# Patient Record
Sex: FEMALE | Race: WHITE | Employment: PART TIME | URBAN - NONMETROPOLITAN AREA
[De-identification: names, ages, dates, MRNs, and addresses within clinical notes are randomized per-mention and may not be internally consistent; named-entity substitution may affect disease eponyms.]

---

## 2016-06-22 LAB
CHOLESTEROL, TOTAL: 164 MG/DL
CHOLESTEROL/HDL RATIO: 3.9
HDLC SERPL-MCNC: 42 MG/DL (ref 35–70)
LDL CHOLESTEROL CALCULATED: 105 MG/DL (ref 0–160)
TRIGL SERPL-MCNC: 85 MG/DL
VLDLC SERPL CALC-MCNC: 17 MG/DL

## 2017-06-13 RX ORDER — FLUOXETINE 10 MG/1
1 CAPSULE ORAL DAILY
Refills: 0 | COMMUNITY
Start: 2017-05-02 | End: 2017-06-13 | Stop reason: SDUPTHER

## 2017-06-14 RX ORDER — FLUOXETINE 10 MG/1
10 CAPSULE ORAL DAILY
Qty: 30 CAPSULE | Refills: 5 | Status: SHIPPED | OUTPATIENT
Start: 2017-06-14 | End: 2017-10-03 | Stop reason: ALTCHOICE

## 2017-10-02 VITALS
HEIGHT: 71 IN | HEART RATE: 80 BPM | SYSTOLIC BLOOD PRESSURE: 108 MMHG | WEIGHT: 162 LBS | DIASTOLIC BLOOD PRESSURE: 68 MMHG | BODY MASS INDEX: 22.68 KG/M2

## 2017-10-02 DIAGNOSIS — Z14.1 CYSTIC FIBROSIS GENE CARRIER: ICD-10-CM

## 2017-10-02 RX ORDER — NAPROXEN 500 MG/1
500 TABLET ORAL 2 TIMES DAILY WITH MEALS
COMMUNITY
End: 2017-10-03 | Stop reason: ALTCHOICE

## 2017-10-03 ENCOUNTER — OFFICE VISIT (OUTPATIENT)
Dept: FAMILY MEDICINE CLINIC | Age: 52
End: 2017-10-03

## 2017-10-03 VITALS
SYSTOLIC BLOOD PRESSURE: 122 MMHG | BODY MASS INDEX: 22.76 KG/M2 | DIASTOLIC BLOOD PRESSURE: 70 MMHG | HEIGHT: 70 IN | WEIGHT: 159 LBS | HEART RATE: 64 BPM

## 2017-10-03 DIAGNOSIS — J01.40 ACUTE NON-RECURRENT PANSINUSITIS: Primary | ICD-10-CM

## 2017-10-03 PROCEDURE — 99213 OFFICE O/P EST LOW 20 MIN: CPT | Performed by: FAMILY MEDICINE

## 2017-10-03 RX ORDER — FLUTICASONE PROPIONATE 50 MCG
1 SPRAY, SUSPENSION (ML) NASAL DAILY
Qty: 1 BOTTLE | Refills: 3 | Status: SHIPPED | OUTPATIENT
Start: 2017-10-03 | End: 2017-10-05 | Stop reason: SDUPTHER

## 2017-10-03 RX ORDER — CEFUROXIME AXETIL 250 MG/1
250 TABLET ORAL 2 TIMES DAILY
Qty: 20 TABLET | Refills: 0 | Status: SHIPPED | OUTPATIENT
Start: 2017-10-03 | End: 2017-10-05 | Stop reason: SDUPTHER

## 2017-10-03 ASSESSMENT — ENCOUNTER SYMPTOMS
SWOLLEN GLANDS: 0
COUGH: 0
SINUS PRESSURE: 1
SHORTNESS OF BREATH: 0
SORE THROAT: 0

## 2017-10-03 NOTE — PROGRESS NOTES
[address] [address] [address]  Dept: [phone]  Dept Fax: [phone]    Rowan Cannon is a 54 y.o. female who presents today for her medical conditions/complaints as noted below. Rwoan Cannon is c/o of Sinusitis (pt c/o sinus pressure and pressure in eyes especially right eye x four days, nasal drainage taking Sudafed, denies cough, slight ha, denies sore throat)      HPI:     Sinusitis   This is a new problem. The current episode started in the past 7 days. The problem has been gradually worsening since onset. There has been no fever. The pain is mild. Associated symptoms include congestion, headaches, sinus pressure and sneezing. Pertinent negatives include no chills, coughing, diaphoresis, ear pain, neck pain, shortness of breath, sore throat or swollen glands. Past treatments include acetaminophen and oral decongestants. The treatment provided mild relief. BP Readings from Last 3 Encounters:   10/03/17 122/70   05/24/16 108/68            (goal 120/80)    Past Medical History:   Diagnosis Date    Cervical dysplasia     Nasal septal deviation       Past Surgical History:   Procedure Laterality Date    SEPTOPLASTY  04/2007    TUBAL LIGATION  12/2010     Family History   Problem Relation Age of Onset    High Cholesterol Mother     Bipolar Disorder Father      Social History   Substance Use Topics    Smoking status: Never Smoker    Smokeless tobacco: Not on file    Alcohol use Yes      Comment: 1-2 drinks/day        Current Outpatient Prescriptions   Medication Sig Dispense Refill    fluticasone (FLONASE) 50 MCG/ACT nasal spray 1 spray by Nasal route daily 1 Bottle 3    cefUROXime (CEFTIN) 250 MG tablet Take 1 tablet by mouth 2 times daily for 10 days 20 tablet 0     No current facility-administered medications for this visit.       Allergies   Allergen Reactions    Amitriptyline      Excess drowsiness    Bactrim [Sulfamethoxazole-Trimethoprim]     Sulfa Antibiotics        Health Maintenance   Topic Date Due    HIV screen  10/15/1982    DTaP/Tdap/Td vaccine (1 - Tdap) 10/15/1986    Flu vaccine (1) 2017    Cervical cancer screen  06/10/2019    Lipid screen  2021       Subjective:      Review of Systems   Constitutional: Negative for chills and diaphoresis. HENT: Positive for congestion, sinus pressure and sneezing. Negative for ear pain and sore throat. Respiratory: Negative for cough and shortness of breath. Musculoskeletal: Negative for neck pain. Neurological: Positive for headaches. Objective:     /70  Pulse 64  Ht 5' 10.2\" (1.783 m)  Wt 159 lb (72.1 kg)  BMI 22.68 kg/m2    Physical Exam   Constitutional: No distress. HENT:   Head: Normocephalic and atraumatic. Right Ear: Tympanic membrane normal.   Left Ear: Tympanic membrane normal.   Nose: Mucosal edema and rhinorrhea present. No epistaxis. Right sinus exhibits no maxillary sinus tenderness and no frontal sinus tenderness. Left sinus exhibits no maxillary sinus tenderness and no frontal sinus tenderness. Eyes: EOM are normal. Pupils are equal, round, and reactive to light. Right conjunctiva is injected. Left conjunctiva is injected. Neck: No tracheal tenderness present. Cardiovascular: S1 normal and S2 normal.    No murmur heard. Pulmonary/Chest: Breath sounds normal.       Assessment:     1. Acute non-recurrent pansinusitis  fluticasone (FLONASE) 50 MCG/ACT nasal spray    cefUROXime (CEFTIN) 250 MG tablet            POC Testing Results (If Applicable):  No results found for this visit on 10/03/17.     Plan:     Prescriptions:    Orders Placed This Encounter   Medications    fluticasone (FLONASE) 50 MCG/ACT nasal spray     Si spray by Nasal route daily     Dispense:  1 Bottle     Refill:  3    cefUROXime (CEFTIN) 250 MG tablet     Sig: Take 1 tablet by mouth 2 times daily for 10 days     Dispense:  20 tablet

## 2017-10-05 RX ORDER — FLUTICASONE PROPIONATE 50 MCG
1 SPRAY, SUSPENSION (ML) NASAL DAILY
Qty: 1 BOTTLE | Refills: 3 | Status: SHIPPED | OUTPATIENT
Start: 2017-10-05

## 2017-10-05 RX ORDER — CEFUROXIME AXETIL 250 MG/1
250 TABLET ORAL 2 TIMES DAILY
Qty: 20 TABLET | Refills: 0 | Status: SHIPPED | OUTPATIENT
Start: 2017-10-05 | End: 2017-10-15

## 2017-11-17 DIAGNOSIS — Z12.31 ENCOUNTER FOR SCREENING MAMMOGRAM FOR MALIGNANT NEOPLASM OF BREAST: Primary | ICD-10-CM

## 2018-06-11 ENCOUNTER — TELEPHONE (OUTPATIENT)
Dept: FAMILY MEDICINE CLINIC | Age: 53
End: 2018-06-11

## 2019-09-25 ENCOUNTER — OFFICE VISIT (OUTPATIENT)
Dept: FAMILY MEDICINE CLINIC | Age: 54
End: 2019-09-25
Payer: COMMERCIAL

## 2019-09-25 VITALS
BODY MASS INDEX: 21.08 KG/M2 | SYSTOLIC BLOOD PRESSURE: 114 MMHG | DIASTOLIC BLOOD PRESSURE: 76 MMHG | RESPIRATION RATE: 16 BRPM | WEIGHT: 150.6 LBS | HEIGHT: 71 IN | HEART RATE: 80 BPM

## 2019-09-25 DIAGNOSIS — M54.31 SCIATICA OF RIGHT SIDE: ICD-10-CM

## 2019-09-25 DIAGNOSIS — M79.672 BILATERAL FOOT PAIN: ICD-10-CM

## 2019-09-25 DIAGNOSIS — D72.819 LEUKOPENIA, UNSPECIFIED TYPE: ICD-10-CM

## 2019-09-25 DIAGNOSIS — M79.671 BILATERAL FOOT PAIN: ICD-10-CM

## 2019-09-25 DIAGNOSIS — Z11.4 SCREENING FOR HIV (HUMAN IMMUNODEFICIENCY VIRUS): ICD-10-CM

## 2019-09-25 DIAGNOSIS — Z12.39 SCREENING FOR BREAST CANCER: ICD-10-CM

## 2019-09-25 DIAGNOSIS — M79.671 PAIN IN BOTH FEET: Primary | ICD-10-CM

## 2019-09-25 DIAGNOSIS — M79.672 PAIN IN BOTH FEET: Primary | ICD-10-CM

## 2019-09-25 PROBLEM — H91.90 HEARING LOSS: Status: ACTIVE | Noted: 2019-09-25

## 2019-09-25 LAB
ADDITIONAL TESTING: NORMAL
ANA SCREEN: NORMAL
BASOPHILS # BLD: 0.05 THOU/MM3 (ref 0–0.3)
C-REACTIVE PROTEIN: <0.5 MG/DL (ref 0–1)
DIFFERENTIAL: AUTOMATED DIFF
EOSINOPHIL # BLD: 0.1 THOU/MM3 (ref 0–1.1)
HCT VFR BLD CALC: 41.5 % (ref 37–47)
HEMOGLOBIN: 13.4 G/DL (ref 12–16)
HIV AG/AB: NORMAL
HLA B27: NORMAL
LYMPHOCYTES # BLD: 0.94 THOU/MM3 (ref 1–5.5)
MCH RBC QN AUTO: 31 PG (ref 28.5–32)
MCHC RBC AUTO-ENTMCNC: 32.2 G/DL (ref 32–37)
MCV RBC AUTO: 96.5 FL (ref 80–94)
MONOCYTES # BLD: 0.41 THOU/MM3 (ref 0.1–1)
NEUTROPHILS: 2.34 THOU/MM3 (ref 2–8.1)
PDW BLD-RTO: 11.1 % (ref 8.5–15.5)
PLATELET # BLD: 218 THOU/MM3 (ref 130–400)
PMV BLD AUTO: 6.8 FL (ref 7.4–11)
RA TITER: NORMAL
RBC # BLD: 4.3 M/UL (ref 4.2–5.4)
SEDIMENTATION RATE, ERYTHROCYTE: 10 MM/HR (ref 0–30)
URIC ACID: 3.6 MG/DL (ref 3.5–8.5)
WBC # BLD: 3.84 THOU/ML3 (ref 4.8–10)

## 2019-09-25 PROCEDURE — G8427 DOCREV CUR MEDS BY ELIG CLIN: HCPCS | Performed by: FAMILY MEDICINE

## 2019-09-25 PROCEDURE — 3017F COLORECTAL CA SCREEN DOC REV: CPT | Performed by: FAMILY MEDICINE

## 2019-09-25 PROCEDURE — G8420 CALC BMI NORM PARAMETERS: HCPCS | Performed by: FAMILY MEDICINE

## 2019-09-25 PROCEDURE — 99203 OFFICE O/P NEW LOW 30 MIN: CPT | Performed by: FAMILY MEDICINE

## 2019-09-25 PROCEDURE — 1036F TOBACCO NON-USER: CPT | Performed by: FAMILY MEDICINE

## 2019-09-25 RX ORDER — AMLODIPINE BESYLATE 2.5 MG/1
2.5 TABLET ORAL DAILY
Qty: 30 TABLET | Refills: 5 | Status: SHIPPED | OUTPATIENT
Start: 2019-09-25 | End: 2019-11-26

## 2019-09-25 RX ORDER — TRAMADOL HYDROCHLORIDE 50 MG/1
50 TABLET ORAL EVERY 6 HOURS PRN
Qty: 20 TABLET | Refills: 0 | Status: SHIPPED | OUTPATIENT
Start: 2019-09-25 | End: 2019-09-30

## 2019-09-25 ASSESSMENT — PATIENT HEALTH QUESTIONNAIRE - PHQ9
2. FEELING DOWN, DEPRESSED OR HOPELESS: 0
SUM OF ALL RESPONSES TO PHQ QUESTIONS 1-9: 0
SUM OF ALL RESPONSES TO PHQ9 QUESTIONS 1 & 2: 0
SUM OF ALL RESPONSES TO PHQ QUESTIONS 1-9: 0
1. LITTLE INTEREST OR PLEASURE IN DOING THINGS: 0

## 2019-09-25 NOTE — PATIENT INSTRUCTIONS
Patient Education        Raynaud's Phenomenon: Care Instructions  Overview  Raynaud's is a condition that causes your hands and feet to overreact to cold. They may become painful and numb, and they can change colors, becoming very pale and then blue. This condition also is called Raynaud's phenomenon. There are two kinds of Raynaud's. Primary Raynaud's, also known as Raynaud's disease, happens by itself and is the most common form. Secondary Raynaud's, also called Raynaud's syndrome, happens as part of another disease. In Raynaud's, the small vessels that bring blood to the skin either become narrow, or constrict for a short period of time. This limits blood flow to the hands and feet and sometimes to the nose or ears. Your hands and feet feel cold and numb and then turn very pale. As blood flow returns, your fingers and toes may turn red, and begin to throb and hurt. Raynaud's can be painful and annoying, but it usually does not cause serious problems. You can take simple steps to protect your hands and feet from the cold. If you have a bad case of Raynaud's and you cannot keep your hands and feet warm enough, your doctor may prescribe medicine. Follow-up care is a key part of your treatment and safety. Be sure to make and go to all appointments, and call your doctor if you are having problems. It's also a good idea to know your test results and keep a list of the medicines you take. How can you care for yourself at home? To prevent Raynaud's episodes or ease symptoms  · Run warm water over your hands or feet to increase blood flow. Use another part of your body, such as your forearm, to make sure the water is not too hot; you could burn your hands or feet and not feel it because they are numb. · Swing your arms in a [place] at the sides of your body (\"windmilling\") to increase blood flow. · If your doctor prescribes medicine to help Raynaud's, take it exactly as prescribed.  Call your doctor if you think you

## 2019-10-01 NOTE — RESULT ENCOUNTER NOTE
Please advise [place] [facility] that her NIDIA test was positive. I recommend referral to rheumatology. Please order a referral.    Her HIV test was negative.

## 2019-10-02 ENCOUNTER — TELEPHONE (OUTPATIENT)
Dept: FAMILY MEDICINE CLINIC | Age: 54
End: 2019-10-02

## 2019-10-03 DIAGNOSIS — R76.8 POSITIVE ANA (ANTINUCLEAR ANTIBODY): Primary | ICD-10-CM

## 2019-10-10 ENCOUNTER — TELEPHONE (OUTPATIENT)
Dept: PRIMARY CARE CLINIC | Age: 54
End: 2019-10-10

## 2019-10-11 DIAGNOSIS — R76.8 POSITIVE ANA (ANTINUCLEAR ANTIBODY): Primary | ICD-10-CM

## 2019-10-24 LAB
ADDITIONAL TESTING: NORMAL
ANA SCREEN: NORMAL
C3 COMPLEMENT: NORMAL
CENTROMERE ANTIBODY: NORMAL
DOUBLE STRANDED DNA AB, IGG: NORMAL
Lab: NORMAL
SCLERODERMA ANTIBODY, IGG: NORMAL

## 2019-11-26 ENCOUNTER — OFFICE VISIT (OUTPATIENT)
Dept: FAMILY MEDICINE CLINIC | Age: 54
End: 2019-11-26
Payer: COMMERCIAL

## 2019-11-26 ENCOUNTER — TELEPHONE (OUTPATIENT)
Dept: SURGERY | Age: 54
End: 2019-11-26

## 2019-11-26 VITALS
DIASTOLIC BLOOD PRESSURE: 84 MMHG | HEART RATE: 78 BPM | WEIGHT: 151 LBS | RESPIRATION RATE: 16 BRPM | BODY MASS INDEX: 21.14 KG/M2 | HEIGHT: 71 IN | SYSTOLIC BLOOD PRESSURE: 124 MMHG

## 2019-11-26 DIAGNOSIS — Z12.11 SCREENING FOR COLON CANCER: ICD-10-CM

## 2019-11-26 DIAGNOSIS — I73.00 RAYNAUD'S PHENOMENON WITHOUT GANGRENE: ICD-10-CM

## 2019-11-26 DIAGNOSIS — M79.672 BILATERAL FOOT PAIN: Primary | ICD-10-CM

## 2019-11-26 DIAGNOSIS — M79.671 BILATERAL FOOT PAIN: Primary | ICD-10-CM

## 2019-11-26 PROCEDURE — 99213 OFFICE O/P EST LOW 20 MIN: CPT | Performed by: FAMILY MEDICINE

## 2019-11-26 PROCEDURE — G8427 DOCREV CUR MEDS BY ELIG CLIN: HCPCS | Performed by: FAMILY MEDICINE

## 2019-11-26 PROCEDURE — 3017F COLORECTAL CA SCREEN DOC REV: CPT | Performed by: FAMILY MEDICINE

## 2019-11-26 PROCEDURE — G8420 CALC BMI NORM PARAMETERS: HCPCS | Performed by: FAMILY MEDICINE

## 2019-11-26 PROCEDURE — G8484 FLU IMMUNIZE NO ADMIN: HCPCS | Performed by: FAMILY MEDICINE

## 2019-11-26 PROCEDURE — 1036F TOBACCO NON-USER: CPT | Performed by: FAMILY MEDICINE

## 2019-11-26 RX ORDER — AMLODIPINE BESYLATE 5 MG/1
5 TABLET ORAL DAILY
COMMUNITY
Start: 2019-11-21 | End: 2020-03-04

## 2019-11-26 ASSESSMENT — PATIENT HEALTH QUESTIONNAIRE - PHQ9
SUM OF ALL RESPONSES TO PHQ9 QUESTIONS 1 & 2: 0
1. LITTLE INTEREST OR PLEASURE IN DOING THINGS: 0
SUM OF ALL RESPONSES TO PHQ QUESTIONS 1-9: 0
SUM OF ALL RESPONSES TO PHQ QUESTIONS 1-9: 0
2. FEELING DOWN, DEPRESSED OR HOPELESS: 0

## 2019-12-10 ENCOUNTER — TELEPHONE (OUTPATIENT)
Dept: FAMILY MEDICINE CLINIC | Age: 54
End: 2019-12-10

## 2019-12-11 ENCOUNTER — TELEPHONE (OUTPATIENT)
Dept: FAMILY MEDICINE CLINIC | Age: 54
End: 2019-12-11

## 2019-12-11 DIAGNOSIS — M79.671 BILATERAL FOOT PAIN: Primary | ICD-10-CM

## 2019-12-11 DIAGNOSIS — M79.672 BILATERAL FOOT PAIN: Primary | ICD-10-CM

## 2019-12-12 ENCOUNTER — OFFICE VISIT (OUTPATIENT)
Dept: PODIATRY | Age: 54
End: 2019-12-12
Payer: COMMERCIAL

## 2019-12-12 VITALS
BODY MASS INDEX: 21.47 KG/M2 | WEIGHT: 150 LBS | DIASTOLIC BLOOD PRESSURE: 70 MMHG | HEART RATE: 80 BPM | HEIGHT: 70 IN | SYSTOLIC BLOOD PRESSURE: 118 MMHG

## 2019-12-12 DIAGNOSIS — M79.2 NEUROPATHIC PAIN: Primary | ICD-10-CM

## 2019-12-12 DIAGNOSIS — I73.00 RAYNAUD'S PHENOMENON WITHOUT GANGRENE: ICD-10-CM

## 2019-12-12 DIAGNOSIS — Q66.70 PES CAVUS: ICD-10-CM

## 2019-12-12 PROCEDURE — 3017F COLORECTAL CA SCREEN DOC REV: CPT | Performed by: PODIATRIST

## 2019-12-12 PROCEDURE — 99203 OFFICE O/P NEW LOW 30 MIN: CPT | Performed by: PODIATRIST

## 2019-12-12 PROCEDURE — 1036F TOBACCO NON-USER: CPT | Performed by: PODIATRIST

## 2019-12-12 PROCEDURE — G8420 CALC BMI NORM PARAMETERS: HCPCS | Performed by: PODIATRIST

## 2019-12-12 PROCEDURE — G8427 DOCREV CUR MEDS BY ELIG CLIN: HCPCS | Performed by: PODIATRIST

## 2019-12-12 PROCEDURE — G8484 FLU IMMUNIZE NO ADMIN: HCPCS | Performed by: PODIATRIST

## 2019-12-12 RX ORDER — GABAPENTIN 300 MG/1
300 CAPSULE ORAL 2 TIMES DAILY
Qty: 60 CAPSULE | Refills: 0 | Status: SHIPPED | OUTPATIENT
Start: 2019-12-12 | End: 2020-03-04

## 2019-12-16 ENCOUNTER — TELEPHONE (OUTPATIENT)
Dept: PODIATRY | Age: 54
End: 2019-12-16

## 2020-02-17 LAB
ANION GAP SERPL CALCULATED.3IONS-SCNC: 9.3 MMOL/L
APTT: 29.8 SEC (ref 22–32)
BUN BLDV-MCNC: 16 MG/DL (ref 7–17)
CALCIUM SERPL-MCNC: 9.5 MG/DL (ref 8.4–10.2)
CHLORIDE BLD-SCNC: 102 MMOL/L (ref 98–120)
CO2: 27 MMOL/L (ref 22–31)
CREAT SERPL-MCNC: 0.6 MG/DL (ref 0.5–1)
GFR CALCULATED: > 60
GLUCOSE: 80 MG/DL (ref 65–105)
HCG QUALITATIVE: NEGATIVE
INR BLD: 1.2 RATIO (ref 0.8–1.2)
POTASSIUM SERPL-SCNC: 4.3 MMOL/L (ref 3.6–5)
PROTHROMBIN TIME: 13.7 SEC (ref 9.3–12.5)
SODIUM BLD-SCNC: 139 MMOL/L (ref 135–145)

## 2020-03-04 ENCOUNTER — OFFICE VISIT (OUTPATIENT)
Dept: FAMILY MEDICINE CLINIC | Age: 55
End: 2020-03-04
Payer: COMMERCIAL

## 2020-03-04 VITALS
HEIGHT: 71 IN | SYSTOLIC BLOOD PRESSURE: 116 MMHG | TEMPERATURE: 97.1 F | BODY MASS INDEX: 21.03 KG/M2 | OXYGEN SATURATION: 98 % | RESPIRATION RATE: 16 BRPM | HEART RATE: 82 BPM | WEIGHT: 150.2 LBS | DIASTOLIC BLOOD PRESSURE: 78 MMHG

## 2020-03-04 PROCEDURE — G8484 FLU IMMUNIZE NO ADMIN: HCPCS | Performed by: FAMILY MEDICINE

## 2020-03-04 PROCEDURE — 1036F TOBACCO NON-USER: CPT | Performed by: FAMILY MEDICINE

## 2020-03-04 PROCEDURE — G8420 CALC BMI NORM PARAMETERS: HCPCS | Performed by: FAMILY MEDICINE

## 2020-03-04 PROCEDURE — 99213 OFFICE O/P EST LOW 20 MIN: CPT | Performed by: FAMILY MEDICINE

## 2020-03-04 PROCEDURE — 3017F COLORECTAL CA SCREEN DOC REV: CPT | Performed by: FAMILY MEDICINE

## 2020-03-04 PROCEDURE — G8427 DOCREV CUR MEDS BY ELIG CLIN: HCPCS | Performed by: FAMILY MEDICINE

## 2020-03-04 RX ORDER — AMOXICILLIN 875 MG/1
875 TABLET, COATED ORAL 2 TIMES DAILY
Qty: 20 TABLET | Refills: 0 | Status: SHIPPED | OUTPATIENT
Start: 2020-03-04 | End: 2020-03-14

## 2020-03-04 ASSESSMENT — PATIENT HEALTH QUESTIONNAIRE - PHQ9
SUM OF ALL RESPONSES TO PHQ9 QUESTIONS 1 & 2: 0
SUM OF ALL RESPONSES TO PHQ QUESTIONS 1-9: 0
2. FEELING DOWN, DEPRESSED OR HOPELESS: 0
SUM OF ALL RESPONSES TO PHQ QUESTIONS 1-9: 0
1. LITTLE INTEREST OR PLEASURE IN DOING THINGS: 0

## 2020-03-04 NOTE — PROGRESS NOTES
[address]                        Telephone [phone]             Fax [phone]     Ray Ch  1965  MRN:  [id number]  Date of visit:  3/4/2020    Subjective:    Ray Ch is a 48 y.o.  female who presents to [facility] today (3/4/2020) for follow up/evaluation of:  Nasal Congestion      Patient is here today for sinus congestion, facial pressure, and green colored drainage for the past 10 days. She also reports a cough at night. She reports pressure behind her eyes. She reports fatigue. She denies fever. She has been using Flonase and Sudafed. 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 She  reports that she has never smoked. She has never used smokeless tobacco.      Objective:    Vitals:    03/04/20 1007   BP: 116/78   Site: Right Upper Arm   Position: Sitting   Cuff Size: Medium Adult   Pulse: 82   Resp: 16   Temp: 97.1 °F (36.2 °C)   TempSrc: Tympanic   SpO2: 98%   Weight: 150 lb 3.2 oz (68.1 kg)   Height: 5' 11\" (1.803 m)     Body mass index is 20.95 kg/m². Well-nourished, well-developed  female, alert, cooperative and in no distress. There is tenderness to palpation of the frontal and maxillary sinuses bilaterally. The tympanic membranes are clear bilaterally.   The oropharynx is clear. Neck supple. No adenopathy. Chest:  Normal expansion. Clear to auscultation. No rales, rhonchi, or wheezing. Respirations are not labored. Heart sounds are normal.  Regular rate and rhythm without murmur, gallop or rub. Assessment and Plan:    Acute pansinusitis, recurrence not specified  - amoxicillin (AMOXIL) 875 MG tablet; Take 1 tablet by mouth 2 times daily for 10 days  Dispense: 20 tablet; Refill: 0    She was advised to follow up if symptoms worsen or do not resolve.         (Please note that portions of this note were completed with a voice-recognition program. Efforts were made to edit the dictation but occasionally words are mis-transcribed.)

## 2020-07-20 ENCOUNTER — OFFICE VISIT (OUTPATIENT)
Dept: PRIMARY CARE CLINIC | Age: 55
End: 2020-07-20
Payer: COMMERCIAL

## 2020-07-20 ENCOUNTER — HOSPITAL ENCOUNTER (OUTPATIENT)
Age: 55
Setting detail: SPECIMEN
Discharge: HOME | End: 2020-07-20
Payer: COMMERCIAL

## 2020-07-20 VITALS
DIASTOLIC BLOOD PRESSURE: 80 MMHG | TEMPERATURE: 99.7 F | OXYGEN SATURATION: 98 % | WEIGHT: 144 LBS | BODY MASS INDEX: 20.08 KG/M2 | SYSTOLIC BLOOD PRESSURE: 110 MMHG | HEART RATE: 80 BPM

## 2020-07-20 PROCEDURE — 99213 OFFICE O/P EST LOW 20 MIN: CPT | Performed by: PHYSICIAN ASSISTANT

## 2020-07-20 PROCEDURE — 3017F COLORECTAL CA SCREEN DOC REV: CPT | Performed by: PHYSICIAN ASSISTANT

## 2020-07-20 PROCEDURE — G8420 CALC BMI NORM PARAMETERS: HCPCS | Performed by: PHYSICIAN ASSISTANT

## 2020-07-20 PROCEDURE — 1036F TOBACCO NON-USER: CPT | Performed by: PHYSICIAN ASSISTANT

## 2020-07-20 PROCEDURE — G8427 DOCREV CUR MEDS BY ELIG CLIN: HCPCS | Performed by: PHYSICIAN ASSISTANT

## 2020-07-20 PROCEDURE — U0003 INFECTIOUS AGENT DETECTION BY NUCLEIC ACID (DNA OR RNA); SEVERE ACUTE RESPIRATORY SYNDROME CORONAVIRUS 2 (SARS-COV-2) (CORONAVIRUS DISEASE [COVID-19]), AMPLIFIED PROBE TECHNIQUE, MAKING USE OF HIGH THROUGHPUT TECHNOLOGIES AS DESCRIBED BY CMS-2020-01-R: HCPCS

## 2020-07-20 ASSESSMENT — ENCOUNTER SYMPTOMS
VOMITING: 0
DIARRHEA: 0
NAUSEA: 0
CHEST TIGHTNESS: 0
WHEEZING: 0
RHINORRHEA: 1
TROUBLE SWALLOWING: 0
COUGH: 1
SORE THROAT: 1
SHORTNESS OF BREATH: 1

## 2020-07-20 NOTE — PROGRESS NOTES
Genitourinary: Negative for difficulty urinating and dysuria. Musculoskeletal: Positive for myalgias. Skin: Negative for rash. Neurological: Positive for headaches. Negative for dizziness, light-headedness and numbness. Psychiatric/Behavioral: Negative for sleep disturbance. The patient is not nervous/anxious. Slept ok last night. Objective:      /80 (Site: Left Upper Arm, Position: Sitting, Cuff Size: Medium Adult)   Pulse 80   Temp 99.7 °F (37.6 °C) (Tympanic)   Wt 144 lb (65.3 kg)   LMP 11/25/2017   SpO2 98%   BMI 20.08 kg/m²     Physical Exam  Vitals signs and nursing note reviewed. Constitutional:       General: She is not in acute distress. Appearance: She is well-developed and normal weight. She is ill-appearing. HENT:      Head: Normocephalic and atraumatic. Right Ear: Tympanic membrane, ear canal and external ear normal.      Left Ear: Tympanic membrane, ear canal and external ear normal.      Nose: Congestion present. No rhinorrhea. Mouth/Throat:      Mouth: Mucous membranes are moist.      Pharynx: No oropharyngeal exudate or posterior oropharyngeal erythema. Eyes:      General: No scleral icterus. Conjunctiva/sclera: Conjunctivae normal.   Neck:      Musculoskeletal: Normal range of motion and neck supple. Muscular tenderness present. No neck rigidity. Comments: Tender mild lymphadenopathy in the tonsillar areas or cervical anterior chain noted. Cardiovascular:      Rate and Rhythm: Normal rate and regular rhythm. Heart sounds: Normal heart sounds. No murmur. No gallop. Pulmonary:      Effort: Pulmonary effort is normal. No respiratory distress. Breath sounds: Normal breath sounds. No wheezing, rhonchi or rales. Abdominal:      General: There is no distension. Palpations: Abdomen is soft. There is no mass. Tenderness: There is no abdominal tenderness. There is no guarding or rebound.       Hernia: No hernia is present. Comments: Bowel sounds diminished across the abdomen. Musculoskeletal:      Right lower leg: No edema. Left lower leg: No edema. Lymphadenopathy:      Cervical: Cervical adenopathy present. Skin:     General: Skin is warm and dry. Findings: No bruising, erythema or rash. Neurological:      General: No focal deficit present. Mental Status: She is alert and oriented to person, place, and time. Psychiatric:         Mood and Affect: Mood normal.         Behavior: Behavior normal.         Thought Content: Thought content normal.         Judgment: Judgment normal.           Assessment & Plan:     1. Cough    - COVID-19 Ambulatory; Future    2. Fever, unspecified fever cause    - COVID-19 Ambulatory; Future    3.  Viral illness      Fluids nsaids rest  Follow up not improving or worsens  Work note given  Answered questions  She will be notified of results          Kirk Elizalde, [address]  7/21/2020 1:49 PM EDT    (Pleasenote that portions of this note were completed with a voice recognition program.Efforts were made to edit the dictations but occasionally words are mis-transcribed.)

## 2020-07-22 ENCOUNTER — TELEPHONE (OUTPATIENT)
Dept: PRIMARY CARE CLINIC | Age: 55
End: 2020-07-22

## 2020-07-22 RX ORDER — AMOXICILLIN 875 MG/1
875 TABLET, COATED ORAL 2 TIMES DAILY
Qty: 20 TABLET | Refills: 0 | Status: SHIPPED | OUTPATIENT
Start: 2020-07-22 | End: 2020-08-01

## 2020-07-22 NOTE — TELEPHONE ENCOUNTER
Patient states you said you would call her an antibiotic in if her sinus pressure doesn't go away. S he states it is worse now. Can she get that called in to [facility] in [place].  Thanks

## 2020-07-25 LAB — SARS-COV-2, NAA: DETECTED

## 2020-07-26 ENCOUNTER — TELEPHONE (OUTPATIENT)
Dept: PRIMARY CARE CLINIC | Age: 55
End: 2020-07-26

## 2020-07-29 ENCOUNTER — TELEPHONE (OUTPATIENT)
Dept: FAMILY MEDICINE CLINIC | Age: 55
End: 2020-07-29

## 2020-07-29 NOTE — LETTER
[address] A department of [facility]  Phone: [phone]  Fax: [phone]        July 30, 2020     Patient: Cesario Urias   YOB: 1965   Date of Visit: 7/29/2020       To Whom It May Concern: It is my medical opinion that Tristen Xiong should remain out of work until August 3, 2020. Jimi Bal may need to remain out of work until she is fever free. At the time that patient returns to work, patient is to just work regular scheduled work hours with no Overtime for 2 months. If you have any questions or concerns, please don't hesitate to call.     Sincerely,        HUSSAIN Wong

## 2020-07-29 NOTE — TELEPHONE ENCOUNTER
I want her off until 8/3/2020 and can not fevers when she goes back. Go ahead and give note and can put no over time just regular shift for the next 2 months. Can reassess after that.    thanks

## 2020-07-29 NOTE — TELEPHONE ENCOUNTER
Pt calling stating she was seen in [facility] and tested positive for COVID on 7-20 and [facility] [employer] put her off work until 8-1, but pt's work wants her to come back after the 10 days, pt requesting a slip to put her off work until [employer] and stating pt cannot work any overtime once going back, to only work her regular, please advise at above number, pt states she is still running a temp of 100.2 last night.   Pt is employed at [employer].

## 2020-07-30 ENCOUNTER — E-VISIT (OUTPATIENT)
Dept: FAMILY MEDICINE CLINIC | Age: 55
End: 2020-07-30

## 2020-07-30 ASSESSMENT — LIFESTYLE VARIABLES: SMOKING_STATUS: NO, I HAVE NEVER SMOKED

## 2020-07-30 NOTE — PROGRESS NOTES
Please call the patient. She had a positive Covid-19 test on 7/20/2020. There is a telephone encounter from yesterday. Marcy Arce provided a letter for work. Clarisse Mendoza LPN called the patient. She was unaware that a letter was ready for her. She stated that she does not need an e-visit.

## 2021-02-11 ENCOUNTER — HOSPITAL ENCOUNTER (OUTPATIENT)
Age: 56
Setting detail: SPECIMEN
Discharge: HOME | End: 2021-02-11
Payer: COMMERCIAL

## 2021-02-11 ENCOUNTER — OFFICE VISIT (OUTPATIENT)
Dept: FAMILY MEDICINE CLINIC | Age: 56
End: 2021-02-11
Payer: COMMERCIAL

## 2021-02-11 VITALS
HEART RATE: 68 BPM | RESPIRATION RATE: 16 BRPM | BODY MASS INDEX: 21 KG/M2 | DIASTOLIC BLOOD PRESSURE: 72 MMHG | WEIGHT: 150 LBS | SYSTOLIC BLOOD PRESSURE: 120 MMHG | HEIGHT: 71 IN

## 2021-02-11 DIAGNOSIS — Z12.11 SCREENING FOR COLON CANCER: ICD-10-CM

## 2021-02-11 DIAGNOSIS — Z01.419 WELL FEMALE EXAM WITH ROUTINE GYNECOLOGICAL EXAM: Primary | ICD-10-CM

## 2021-02-11 DIAGNOSIS — Z00.00 HEALTH CARE MAINTENANCE: ICD-10-CM

## 2021-02-11 DIAGNOSIS — N95.2 ATROPHIC VAGINITIS: ICD-10-CM

## 2021-02-11 DIAGNOSIS — Z01.419 WELL FEMALE EXAM WITH ROUTINE GYNECOLOGICAL EXAM: ICD-10-CM

## 2021-02-11 DIAGNOSIS — Z86.16 HISTORY OF COVID-19: ICD-10-CM

## 2021-02-11 DIAGNOSIS — Z12.31 ENCOUNTER FOR SCREENING MAMMOGRAM FOR BREAST CANCER: ICD-10-CM

## 2021-02-11 PROBLEM — I73.00 RAYNAUD PHENOMENON: Status: ACTIVE | Noted: 2021-02-11

## 2021-02-11 PROCEDURE — 87624 HPV HI-RISK TYP POOLED RSLT: CPT

## 2021-02-11 PROCEDURE — 99396 PREV VISIT EST AGE 40-64: CPT | Performed by: FAMILY MEDICINE

## 2021-02-11 PROCEDURE — G8484 FLU IMMUNIZE NO ADMIN: HCPCS | Performed by: FAMILY MEDICINE

## 2021-02-11 PROCEDURE — G0145 SCR C/V CYTO,THINLAYER,RESCR: HCPCS

## 2021-02-11 RX ORDER — ASPIRIN 81 MG/1
81 TABLET, CHEWABLE ORAL 2 TIMES DAILY
COMMUNITY

## 2021-02-11 RX ORDER — CONJUGATED ESTROGENS 0.62 MG/G
CREAM VAGINAL
Qty: 1 TUBE | Refills: 3 | Status: SHIPPED | OUTPATIENT
Start: 2021-02-11 | End: 2021-08-11

## 2021-02-11 ASSESSMENT — PATIENT HEALTH QUESTIONNAIRE - PHQ9
SUM OF ALL RESPONSES TO PHQ9 QUESTIONS 1 & 2: 0
2. FEELING DOWN, DEPRESSED OR HOPELESS: 0
SUM OF ALL RESPONSES TO PHQ QUESTIONS 1-9: 0

## 2021-02-11 NOTE — PATIENT INSTRUCTIONS
You will be contacted when your Pap test results are available. If you have not heard from our office about your Pap test results in 2 weeks, please call.

## 2021-02-11 NOTE — PROGRESS NOTES
[sulfamethoxazole-trimethoprim]; and sulfa antibiotics. She  reports that she has never smoked. She has never used smokeless tobacco.      Objective:    Vitals:    02/11/21 1132   BP: 120/72   Site: Right Upper Arm   Position: Sitting   Cuff Size: Large Adult   Pulse: 68   Resp: 16   Weight: 150 lb (68 kg)   Height: 5' 11\" (1.803 m)     Body mass index is 20.92 kg/m². Well-nourished, well-developed female, healthy-appearing, healthy-appearing, alert, no distress, cooperative . Neck supple. No adenopathy. Thyroid symmetric, normal size,  Breasts are symmetric. There are no dominant masses palpated bilaterally. There are no skin changes bilaterally. There are no masses palpated in the axillae bilaterally. Chest is clear to auscultation, no wheezes, rales, or rhonchi. Heart sounds are regular rate and rhythm, no murmurs. Abdomen soft, non-tender. No masses or organomegaly. Genito-urinary:  External genitalia has no visible lesions, vagina is normal, cervix has no visible lesions. Uterus and adnexa are non-tender and not enlarged. Rectal exam:   There are no masses palpated. Stool tested heme negative. Lower extremities have no edema. Assessment and Plan:    1. Well female exam with routine gynecological exam  Pap smear was obtained today using the Thin Prep method. She will be contacted with results. Mammogram was ordered. She was advised to continue annual mammograms and physical exams. 2. Atrophic vaginitis  I recommend a trial of Premarin:  - conjugated estrogens (PREMARIN) 0.625 MG/GM vaginal cream; Place vaginally daily x 2 weeks, then use 3 times weekly. Dispense: 1 Tube; Refill: 3    She was advised to follow up if symptoms worsen or do not resolve. 3. History of COVID-19  She was encouraged to get the Covid-19 vaccine. 4.  Routine health maintenance  Health maintenance was reviewed with the patient. As above, she had labs done through her employer recently.   I asked her to bring a copy of the lab results in so the results can be included in her electronic medical record. She has received an influenza vaccine this influenza season. Colonoscopy was recommended. A referral was made to general surgery. Hepatitis C screening was recommended and declined. Screening mammogram was recommended and ordered. All other health maintenance, including Tdap and Shingrix, is up to date at this time. There is no indication for Pneumovax or Prevnar-13 at this time.          (Please note that portions of this note were completed with a voice-recognition program. Efforts were made to edit the dictation but occasionally words are mis-transcribed.)

## 2021-02-15 LAB
HPV SAMPLE: NORMAL
HPV, GENOTYPE 16: NOT DETECTED
HPV, GENOTYPE 18: NOT DETECTED
HPV, HIGH RISK OTHER: NOT DETECTED
HPV, INTERPRETATION: NORMAL
SPECIMEN DESCRIPTION: NORMAL

## 2021-02-23 LAB — CYTOLOGY REPORT: NORMAL

## 2021-02-25 DIAGNOSIS — R87.618 OTHER ABNORMAL CYTOLOGICAL FINDING OF SPECIMEN FROM CERVIX: Primary | ICD-10-CM

## 2021-04-22 ENCOUNTER — TELEPHONE (OUTPATIENT)
Dept: SURGERY | Age: 56
End: 2021-04-22

## 2021-04-22 DIAGNOSIS — Z01.818 PRE-OP TESTING: Primary | ICD-10-CM

## 2021-04-22 DIAGNOSIS — Z12.11 SCREEN FOR COLON CANCER: ICD-10-CM

## 2021-04-22 RX ORDER — POLYETHYLENE GLYCOL 3350 17 G/17G
POWDER, FOR SOLUTION ORAL
Qty: 1 BOTTLE | Refills: 0 | Status: SHIPPED | OUTPATIENT
Start: 2021-04-22 | End: 2021-08-11

## 2021-04-22 NOTE — TELEPHONE ENCOUNTER
[facility] Colonoscopy Worksheet    Patient Name: Asuncion Dobson  : 1965  Primary Care Physician: Mckenna Nguyen MD  Today's Date: 2021    Surgery Location:   [][place] [] [place] [x] [address] [] [facility]    Why has a Colonoscopy been recommended for you? Screening    To properly code your procedure we must ask the following questions. PLEASE CHECK ALL THAT APPLY. Are you currently having any of the following symptoms? No    [] Rectal Bleeding (K62.5) [] Bloody Stool (K92.1) [] Dark Tarry Stool (K92.1)  [] Fecal Occult Positive Blood (confirmed by blood test R19.5)  [] Anemia (low hemoglobin D64.9) [] Abdominal Pain (R10.84) [] Diarrhea (R19.7)    **If you have any of these symptoms your colonoscopy is diagnostic and must be coded as such. It will not be coded as a screening colonoscopy. If you have no symptoms but have a personal history of polyps or a family history of colon cancer you fall in the high risk-screening category and we need to know more information. If you have had a polyp or polyps removed at your last colonoscopy then the first scope after that is considered diagnostic. Also if you have irritable bowel syndrome Chron's disease, diverticulitis or colon cancer then the scope would be a diagnostic colonoscopy. Have you ever had a colonoscopy? [] Yes  [x] No    If so, where and when was that done? Was anything found during the last scope? Was it removed? Do you have a family history of colon cancer? No    Have you personally been diagnosed with colon cancer? No    Any tobacco use? [] Yes    [x] No    Any alcohol use? [x] Yes    [] No  If yes, how often? Socially    In the last six (6) months have you experienced any of the following symptoms?    [] Blood from the rectum or stool  [] Abdominal Pain   [] Diarrhea  [] Itching of rectum   [] Vomiting  [] Constipation   [] Black stools  [] Bloating   [] Mucous in your stool  [] [place]   [] Change in bowel habits    Do you have allergies? [x] Yes  If yes, please list: Sulfa[] No    Do you take Warfarin, Coumadin, Plavix, Eliquis, Xarelto, or aspirin OR do you take a medication that thins your blood? [x] Yes  [] No    Please list all of your medications including over-the-counter and herbal supplements  ASA 81mg x 2 daily    Flonase PRN    Estrogen twice weekly                  List your past surgical procedures    Left L Stent    Hammer toe                       Medical History  Do you have any history of:  [x] None [] Heart Disease [] Hypertension  [] Diabetes [] Seizures [] Respiratory/Asthma  [] Sleep Apnea [] G.E.R.D [] Blood Disorder  [] Vascular Disease [] Depression    List the medical problems you are being treated for    Menopause    Vascular occlusion                        History of MRSA? No        Have you check with your insurance company to see what you BENEFITS are id you have had a colonoscopy? If you have not check with them we encourage you to find this information out before the procedure. Below are codes you may need to five them. CPT and Diagnosis: FOR OFFICE USE ONLY  34369 Diagnostic colonoscopy- All patients Symptoms (check above or list):   77894 Screening colonoscopy for NON-MEDICARE patients Diagnosis code: Z12.11   Screening colonoscopy for MEDICARE patients Diagnosis code: Z12.11   Screening colonoscopy for MEDICARE- HIGH RISK PATIENTS   Z85.038- Personal history malignant neoplasm, large intestine   Z85.048- Personal history malignant neoplasm, rectum/anus   Z86.010- Personal history colon polyps   Z80.0- Family history malignant neoplasm   Z83.79- Family history digestive disorder    Reviewed by nurse:  Frandy Tyson LPN       SURGERY SCHEDULED FOR May 3, 2021        ADDITIONAL NOTES: [employer] Employee

## 2021-05-03 LAB — HCG URINE: NEGATIVE

## 2021-08-11 ENCOUNTER — OFFICE VISIT (OUTPATIENT)
Dept: SURGERY | Age: 56
End: 2021-08-11
Payer: COMMERCIAL

## 2021-08-11 VITALS
DIASTOLIC BLOOD PRESSURE: 85 MMHG | SYSTOLIC BLOOD PRESSURE: 127 MMHG | TEMPERATURE: 98.6 F | BODY MASS INDEX: 21.13 KG/M2 | HEIGHT: 71 IN | HEART RATE: 72 BPM | WEIGHT: 150.9 LBS

## 2021-08-11 DIAGNOSIS — Z01.818 PRE-OP TESTING: Primary | ICD-10-CM

## 2021-08-11 DIAGNOSIS — K80.20 SYMPTOMATIC CHOLELITHIASIS: Primary | ICD-10-CM

## 2021-08-11 PROCEDURE — 99214 OFFICE O/P EST MOD 30 MIN: CPT | Performed by: SURGERY

## 2021-08-11 PROCEDURE — G8427 DOCREV CUR MEDS BY ELIG CLIN: HCPCS | Performed by: SURGERY

## 2021-08-11 PROCEDURE — 3017F COLORECTAL CA SCREEN DOC REV: CPT | Performed by: SURGERY

## 2021-08-11 PROCEDURE — 1036F TOBACCO NON-USER: CPT | Performed by: SURGERY

## 2021-08-11 PROCEDURE — G8420 CALC BMI NORM PARAMETERS: HCPCS | Performed by: SURGERY

## 2021-08-11 NOTE — PROGRESS NOTES
Subjective   Samantha Garcia is a 50 y.o. female who presents today for further evaluation of recent upper abd pain. Pt states for at least the last 2 weeks she has been experiencing abd pain in RUQ and epigastrium with occasional radiation in to back and chest.  On at least 1 occasion the pain became bad enough that she went to ER and states during that time she was having a lot of chest pressure she had work up that did not show any cardiac issues. She followed up with PCP and further work up with [address] [place] [address] showed biliary sludge. On further discussion today pt states she has noticed her symptoms often occur after eating. Denies any nausea or emesis. No changes in bowel movements. She does report pain is usually more pressure like and does report bloated feeling. Comes in today to discuss possible cholecystectomy. Past Medical History:   Diagnosis Date    Cervical dysplasia     Nasal septal deviation        Past Surgical History:   Procedure Laterality Date    COLONOSCOPY  05/03/2021    Dr Norris Him acute findings. Repeat in 10 years   [facility] TOE SURGERY Bilateral 12/01/2020    Rhys Anthony DPM, \[facility]\""    SEPTOPLASTY  04/2007    TUBAL LIGATION  12/2010    VEIN SURGERY Left 09/01/2020    angioplasty and stent left iliac vein, Dr. Negra Pate, [facility]       Current Outpatient Medications   Medication Sig Dispense Refill    aspirin 81 MG chewable tablet Take 81 mg by mouth 2 times daily      fluticasone (FLONASE) 50 MCG/ACT nasal spray 1 spray by Nasal route daily (Patient not taking: Reported on 8/11/2021) 1 Bottle 3     No current facility-administered medications for this visit.        Allergies   Allergen Reactions    Amitriptyline      Excess drowsiness    Bactrim [Sulfamethoxazole-Trimethoprim]     Sulfa Antibiotics        Family History   Problem Relation Age of Onset    High Cholesterol Mother     Hypertension Mother     Cancer Mother 80        bladder    Bipolar Disorder Father     Cystic Fibrosis Son        Social History     Socioeconomic History    Marital status:      Spouse name: Not on file    Number of children: Not on file    Years of education: Not on file    Highest education level: Not on file   Occupational History    Not on file   Tobacco Use    Smoking status: Never Smoker    Smokeless tobacco: Never Used   Substance and Sexual Activity    Alcohol use: Yes     Comment: 1-2 drinks/day    Drug use: Never    Sexual activity: Not on file   Other Topics Concern    Not on file   Social History Narrative    Not on file     Social Determinants of Health     Financial Resource Strain:     Difficulty of Paying Living Expenses:    Food Insecurity:     Worried About Running Out of Food in the Last Year:     [address] in the Last Year:    Transportation Needs:     Lack of Transportation (Medical):      Lack of Transportation (Non-Medical):    Physical Activity:     Days of Exercise per Week:     Minutes of Exercise per Session:    Stress:     Feeling of Stress :    Social Connections:     Frequency of Communication with Friends and Family:     Frequency of Social Gatherings with Friends and Family:     Attends [employer] Services:     Active Member of Clubs or Organizations:     Attends Club or Organization Meetings:     Marital Status:    Intimate Partner Violence:     Fear of Current or Ex-Partner:     Emotionally Abused:     Physically Abused:     Sexually Abused:        ROS:   Review of Systems - General ROS: negative  Psychological ROS: negative  Ophthalmic ROS: negative  ENT ROS: negative  Respiratory ROS: no cough, shortness of breath, or wheezing  Cardiovascular ROS: no chest pain or dyspnea on exertion  Gastrointestinal ROS: per HPI  Genito-Urinary ROS: no dysuria, trouble voiding, or hematuria  Musculoskeletal ROS: negative  Dermatological ROS: negative      Objective   Vitals:    08/11/21 1124   BP: 127/85   Pulse: 72 Temp: 98.6 °F (37 °C)     General:in no apparent distress and well developed and well nourished  Eyes: No gross abnormalities. Ears, Nose, Throat: hearing grossly normal bilaterally  Neck: neck supple and non tender without mass  Lungs: clear to auscultation without wheezes or rales   Heart: S1S2, no mumurs, RRR  Abdomen: soft, non distended, tender to palpation in RUQ and epigastrium, bowel sounds present  Extremity: negative  Neuro: CN II-XII grossly intact      Assessment     1. Biliary sludge with symptoms consistent with symptomatic cholelithiasis      Plan     1. After discussion pt would like to proceed with surgery. Have discussed laparoscopic cholecystectomy. Risks including bleeding, infection, scarring, pain, damage to surrounding structures including bile ducts, retained stone, bile leak, need for additional surgery, conversion to open and anesthesia risks were discussed and consent is obtained. 2.  Pre op work up including labs and CXR. Will await cardiac evaluation before surgery. 3.  Have recommended pt adhere to low fat diet.     Electronically signed by Keara Mendez DO on 8/11/2021 at 5:38 PM      (Please note that portions of this note were completed with a voice recognition program.  Efforts were made to edit the dictations but occasionally words are mis-transcribed.)

## 2021-08-12 ENCOUNTER — TELEPHONE (OUTPATIENT)
Dept: SURGERY | Age: 56
End: 2021-08-12

## 2021-08-12 DIAGNOSIS — Z01.818 PRE-OP TESTING: Primary | ICD-10-CM

## 2021-08-12 NOTE — TELEPHONE ENCOUNTER
Dr Karson Rey, would you like to order an ATB for this pt's upcoming Lap keith? She is allergic to Amitriptyline and Sulfa. Thank you.

## 2021-08-20 LAB
ANION GAP SERPL CALCULATED.3IONS-SCNC: 9.8 MMOL/L
BUN BLDV-MCNC: 16 MG/DL (ref 7–17)
CALCIUM SERPL-MCNC: 9.5 MG/DL (ref 8.4–10.2)
CHLORIDE BLD-SCNC: 100 MMOL/L (ref 98–120)
CO2: 31 MMOL/L (ref 22–31)
CREAT SERPL-MCNC: 0.7 MG/DL (ref 0.5–1)
GFR CALCULATED: > 60
GLUCOSE: 97 MG/DL (ref 65–105)
HCT VFR BLD CALC: 41.8 % (ref 37–47)
HEMOGLOBIN: 14.1 (ref 12–16)
MCH RBC QN AUTO: 32.3 PG (ref 28.5–32.5)
MCHC RBC AUTO-ENTMCNC: 33.8 G/DL (ref 32–37)
MCV RBC AUTO: 95.6 FL (ref 80–94)
PDW BLD-RTO: 11.8 % (ref 8.5–15.5)
PLATELET # BLD: 246.1 THOU/MM3 (ref 130–400)
POTASSIUM SERPL-SCNC: 3.7 MMOL/L (ref 3.6–5)
RBC: 4.37 M/UL (ref 4.2–5.4)
SODIUM BLD-SCNC: 140 MMOL/L (ref 135–145)
WBC: 5.1 THOU/ML3 (ref 4.8–10.8)

## 2021-09-10 LAB — SARS-COV-2: NEGATIVE

## 2021-09-13 DIAGNOSIS — Z01.818 PRE-OP TESTING: ICD-10-CM

## 2021-09-13 LAB — HCG URINE: NEGATIVE

## 2021-09-22 ENCOUNTER — OFFICE VISIT (OUTPATIENT)
Dept: SURGERY | Age: 56
End: 2021-09-22

## 2021-09-22 VITALS
TEMPERATURE: 99.5 F | HEIGHT: 70 IN | DIASTOLIC BLOOD PRESSURE: 75 MMHG | OXYGEN SATURATION: 100 % | SYSTOLIC BLOOD PRESSURE: 132 MMHG | WEIGHT: 146.5 LBS | BODY MASS INDEX: 20.97 KG/M2 | HEART RATE: 74 BPM

## 2021-09-22 DIAGNOSIS — Z09 POSTOP CHECK: Primary | ICD-10-CM

## 2021-09-22 PROCEDURE — 99024 POSTOP FOLLOW-UP VISIT: CPT | Performed by: SURGERY

## 2021-09-22 NOTE — PROGRESS NOTES
Subjective   Pardeep Mckeon is a 50 y.o. female who presents today for follow-up evaluation after lap keith. Patient states that since surgery she has been doing well. Take a few days of pain medication but is no longer requiring any medication for pain control. She has been tolerating diet without any issues. Having regular bowel function. Denies any incisional problems. Past Medical History:   Diagnosis Date    Cervical dysplasia     Nasal septal deviation        Past Surgical History:   Procedure Laterality Date    COLONOSCOPY  05/03/2021    Dr Jennifer Moore acute findings. Repeat in 10 years   [facility] TOE SURGERY Bilateral 12/01/2020    Lonnie Arango DPM, [facility]    SEPTOPLASTY  04/2007    TUBAL LIGATION  12/2010    VEIN SURGERY Left 09/01/2020    angioplasty and stent left iliac vein, Dr. Erin Rosado, [facility]       Current Outpatient Medications   Medication Sig Dispense Refill    aspirin 81 MG chewable tablet Take 81 mg by mouth 2 times daily      fluticasone (FLONASE) 50 MCG/ACT nasal spray 1 spray by Nasal route daily 1 Bottle 3     No current facility-administered medications for this visit.        Allergies   Allergen Reactions    Amitriptyline      Excess drowsiness    Bactrim [Sulfamethoxazole-Trimethoprim]     Sulfa Antibiotics        Family History   Problem Relation Age of Onset    High Cholesterol Mother     Hypertension Mother     Cancer Mother 80        bladder    Bipolar Disorder Father     Cystic Fibrosis Son        Social History     Socioeconomic History    Marital status:      Spouse name: Not on file    Number of children: Not on file    Years of education: Not on file    Highest education level: Not on file   Occupational History    Not on file   Tobacco Use    Smoking status: Never Smoker    Smokeless tobacco: Never Used   Substance and Sexual Activity    Alcohol use: Yes     Comment: 1-2 drinks/day    Drug use: Never    Sexual to regular diet as tolerated. Have recommended an additional 4 weeks of activity restrictions at which time she can return to activity as tolerated. Will follow up as needed. I encouraged the patient to call with any questions concerns or problems and we will get her back in.     Electronically signed by Rachael West DO on 9/22/2021 at 11:30 AM      (Please note that portions of this note were completed with a voice recognition program.  Efforts were made to edit the dictations but occasionally words are mis-transcribed.)

## 2021-12-29 ENCOUNTER — TELEPHONE (OUTPATIENT)
Dept: SURGERY | Age: 56
End: 2021-12-29

## 2021-12-29 NOTE — TELEPHONE ENCOUNTER
Patient called [facility] office stating she would like to schedule for an EGD. Patient stated she is still having abdominal pain following her lap keith with Dr Fabiano Irving on 09/13/2021. She stated Dr. Fabiano Irving told her he would proceed with EGD if her symptoms persists. Patient stated she experiences abdominal pain after eating certain foods such as spicy foods, BM are regular with no constipation or diarrhea. No blood noted in stool. Appetite is normal and denies any nausea or vomiting. Would you like to see her in office first? Or would you like for me to schedule her for EGD in the OR?      Thank you

## 2022-01-11 NOTE — TELEPHONE ENCOUNTER
Notified patient. Patient voiced understanding. Patient will call back next week to get scheduled for EGD at [facility] with Dr. Nano Chan.